# Patient Record
(demographics unavailable — no encounter records)

---

## 2024-12-05 NOTE — ASSESSMENT
210.1 [FreeTextEntry1] : Fecal smearing with hemorrhoids -Smearing likely secondary to overall progressive muscle weakness as well as stool consistency -Recommend the patient attempt bulking his stool -High fiber diet -Metamucil one to 2 times per day -Patient followup in 2-3 months of persistent smearing -Patient agreed to this plan we'll polyp as described

## 2024-12-05 NOTE — HISTORY OF PRESENT ILLNESS
[FreeTextEntry1] : 886-year-old male with past medical history of coronary artery disease status post CABG, amyloidosis aortic root aneurysm hypertension presents for evaluation of fecal smearing. He reports having a soft but formed bowel movements daily with residual fecal material noted several hours later. He denies pain. He reports excessive wiping with smearing and occasionally notes some blood after wiping. Denies abdominal pain no fevers or chills no nausea or vomiting no factors

## 2024-12-05 NOTE — PHYSICAL EXAM
[Abdomen Masses] : No abdominal masses [Abdomen Tenderness] : ~T No ~M abdominal tenderness [JVD] : no jugular venous distention  [No Rash or Lesion] : No rash or lesion [Alert] : alert [Oriented to Person] : oriented to person [Oriented to Place] : oriented to place [Oriented to Time] : oriented to time [Calm] : calm [de-identified] : No apparent distress [de-identified] : Extraocular motion intact [de-identified] : Moves all extremities [FreeTextEntry1] : External anal exam some fecal material Digital rectal exam normal to mildly decreased tone Anoscopy-procedure performed in standard fashion under direct vision with the adult scope. Patient tolerated without event or complication -Small moderate internal hemorrhoids no proctitis

## 2024-12-05 NOTE — CONSULT LETTER
[Dear  ___] : Dear  [unfilled], [Consult Letter:] : I had the pleasure of evaluating your patient, [unfilled]. [Please see my note below.] : Please see my note below. [Consult Closing:] : Thank you very much for allowing me to participate in the care of this patient.  If you have any questions, please do not hesitate to contact me. [Sincerely,] : Sincerely, [FreeTextEntry2] : Eligio Silva [FreeTextEntry3] : Eligio Whtie MD FACS Chief Colon and Rectal Surgery [facility]